# Patient Record
Sex: FEMALE | Race: WHITE | NOT HISPANIC OR LATINO | Employment: FULL TIME | URBAN - METROPOLITAN AREA
[De-identification: names, ages, dates, MRNs, and addresses within clinical notes are randomized per-mention and may not be internally consistent; named-entity substitution may affect disease eponyms.]

---

## 2020-02-13 ENCOUNTER — TELEPHONE (OUTPATIENT)
Dept: SCHEDULING | Facility: IMAGING CENTER | Age: 40
End: 2020-02-13

## 2020-02-14 ENCOUNTER — OFFICE VISIT (OUTPATIENT)
Dept: URGENT CARE | Facility: CLINIC | Age: 40
End: 2020-02-14
Payer: COMMERCIAL

## 2020-02-14 VITALS
WEIGHT: 152 LBS | RESPIRATION RATE: 14 BRPM | TEMPERATURE: 98 F | SYSTOLIC BLOOD PRESSURE: 112 MMHG | OXYGEN SATURATION: 96 % | HEIGHT: 65 IN | HEART RATE: 92 BPM | DIASTOLIC BLOOD PRESSURE: 70 MMHG | BODY MASS INDEX: 25.33 KG/M2

## 2020-02-14 DIAGNOSIS — J06.9 VIRAL URI WITH COUGH: ICD-10-CM

## 2020-02-14 DIAGNOSIS — R68.89 FLU-LIKE SYMPTOMS: ICD-10-CM

## 2020-02-14 PROCEDURE — 99203 OFFICE O/P NEW LOW 30 MIN: CPT | Performed by: PHYSICIAN ASSISTANT

## 2020-02-14 RX ORDER — GUAIFENESIN 600 MG/1
600 TABLET, EXTENDED RELEASE ORAL EVERY 12 HOURS
Qty: 28 TAB | Refills: 0 | Status: SHIPPED | OUTPATIENT
Start: 2020-02-14

## 2020-02-14 RX ORDER — FLUTICASONE PROPIONATE 50 MCG
1 SPRAY, SUSPENSION (ML) NASAL DAILY
Qty: 16 G | Refills: 0 | Status: SHIPPED | OUTPATIENT
Start: 2020-02-14

## 2020-02-14 RX ORDER — BENZONATATE 200 MG/1
200 CAPSULE ORAL 3 TIMES DAILY PRN
Qty: 60 CAP | Refills: 0 | Status: SHIPPED | OUTPATIENT
Start: 2020-02-14

## 2020-02-14 ASSESSMENT — ENCOUNTER SYMPTOMS
SHORTNESS OF BREATH: 0
WHEEZING: 0
CHILLS: 1
SORE THROAT: 1
COUGH: 1
MYALGIAS: 1

## 2020-02-14 ASSESSMENT — COPD QUESTIONNAIRES: COPD: 0

## 2020-02-14 NOTE — PROGRESS NOTES
"Subjective:   Felecia Muniz is a 40 y.o. female who presents for Cough (x1 week, congestion, ache in chest, body aches, eyes sensitive to sun ligth )        Did not receive flu vaccine.    Cough   This is a new problem. The current episode started in the past 7 days. The problem has been gradually improving (sudden onset monday/tuesday). The problem occurs constantly. The cough is productive of sputum. Associated symptoms include chills, ear congestion, ear pain, myalgias, nasal congestion, postnasal drip and a sore throat. Pertinent negatives include no chest pain, rash, shortness of breath or wheezing. Nothing aggravates the symptoms. Treatments tried: advil and theraflu. The treatment provided significant relief. There is no history of asthma, bronchitis, COPD, emphysema or pneumonia.     Review of Systems   Constitutional: Positive for chills.   HENT: Positive for ear pain, postnasal drip and sore throat.    Respiratory: Positive for cough. Negative for shortness of breath and wheezing.    Cardiovascular: Negative for chest pain.   Musculoskeletal: Positive for myalgias.   Skin: Negative for rash.       PMH:  has no past medical history of Allergy or Asthma.  MEDS:   Current Outpatient Medications:   •  guaiFENesin ER (MUCINEX) 600 MG TABLET SR 12 HR, Take 1 Tab by mouth every 12 hours., Disp: 28 Tab, Rfl: 0  •  fluticasone (FLONASE) 50 MCG/ACT nasal spray, Spray 1 Spray in nose every day., Disp: 16 g, Rfl: 0  •  benzonatate (TESSALON) 200 MG capsule, Take 1 Cap by mouth 3 times a day as needed for Cough., Disp: 60 Cap, Rfl: 0  ALLERGIES: No Known Allergies  SURGHX: History reviewed. No pertinent surgical history.  SOCHX:  reports that she has never smoked. She has never used smokeless tobacco.  FH: Family history was reviewed, no pertinent findings to report   Objective:   /70 (Patient Position: Sitting)   Pulse 92   Temp 36.7 °C (98 °F) (Temporal)   Resp 14   Ht 1.651 m (5' 5\")   Wt 68.9 kg (152 lb)  "  SpO2 96%   BMI 25.29 kg/m²   Physical Exam  Vitals signs reviewed.   Constitutional:       General: She is not in acute distress.     Appearance: Normal appearance. She is well-developed. She is not toxic-appearing.   HENT:      Head: Normocephalic and atraumatic.      Right Ear: Tympanic membrane, ear canal and external ear normal.      Left Ear: Ear canal and external ear normal. Tympanic membrane is injected.      Nose: Mucosal edema, congestion and rhinorrhea present. Rhinorrhea is clear.      Mouth/Throat:      Lips: Pink.      Mouth: Mucous membranes are moist.      Pharynx: Oropharynx is clear. Uvula midline. No posterior oropharyngeal erythema.   Eyes:      General: Lids are normal.      Conjunctiva/sclera: Conjunctivae normal.   Neck:      Musculoskeletal: Neck supple.   Cardiovascular:      Rate and Rhythm: Normal rate and regular rhythm.      Heart sounds: Normal heart sounds, S1 normal and S2 normal. No murmur. No friction rub. No gallop.    Pulmonary:      Effort: Pulmonary effort is normal. No respiratory distress.      Breath sounds: Normal breath sounds. No decreased breath sounds, wheezing, rhonchi or rales.   Musculoskeletal:      Comments: Normal range of motion. Exhibits no edema and no tenderness.    Lymphadenopathy:      Cervical: No cervical adenopathy.      Right cervical: No superficial or posterior cervical adenopathy.     Left cervical: No superficial or posterior cervical adenopathy.   Skin:     General: Skin is warm and dry.      Capillary Refill: Capillary refill takes less than 2 seconds.   Neurological:      Mental Status: She is alert and oriented to person, place, and time.      Cranial Nerves: No cranial nerve deficit.      Sensory: No sensory deficit.   Psychiatric:         Speech: Speech normal.         Behavior: Behavior normal.         Thought Content: Thought content normal.         Judgment: Judgment normal.           Assessment/Plan:   1. Viral URI with cough  -  guaiFENesin ER (MUCINEX) 600 MG TABLET SR 12 HR; Take 1 Tab by mouth every 12 hours.  Dispense: 28 Tab; Refill: 0  - fluticasone (FLONASE) 50 MCG/ACT nasal spray; Spray 1 Spray in nose every day.  Dispense: 16 g; Refill: 0  - benzonatate (TESSALON) 200 MG capsule; Take 1 Cap by mouth 3 times a day as needed for Cough.  Dispense: 60 Cap; Refill: 0    2. Flu-like symptoms    Pt presentation consistent with influenza, high prevalence in community currently, and clinical suspicion is high.    VSS, no dyspnea, no SOB, and lungs CTA on PE.  Goals of care include symptomatic control and prevention of lower respiratory spread. Signs of lower respiratory involvement discussed with pt.  Pt instructed to RTC if any of these are observed.     Drink plenty of fluids and rest.  Use nasal saline TID to promote drainage.   Salt water gurgles to soothe sore throat.  Start OTC expectorant.  APAP for fever control, and NSAIDs for throat pain/headache relief prn.    If you fail to improve in 3-5 days or symptoms worsen/new symptoms develop, RTC for reevaluation.    If symptoms worsen or new symptoms develop to include uncontrolled fevers, dyspnea, SOB, or vomiting- pt was instructed to go to the ED for evaluation.    Differential diagnosis, natural history, supportive care, and indications for immediate follow-up discussed.

## 2020-02-14 NOTE — LETTER
February 14, 2020    To Whom It May Concern:         This is confirmation that Felecia Muniz attended her scheduled appointment with Sravan Vargas P.A.-C. on 2/14/20. She may return to work on 2/17/20.         If you have any questions please do not hesitate to call me at the phone number listed below.    Sincerely,          Sravan Vargas P.A.-C.  [phone]

## 2020-05-15 ENCOUNTER — OFFICE VISIT (OUTPATIENT)
Dept: URGENT CARE | Facility: CLINIC | Age: 40
End: 2020-05-15

## 2020-05-15 VITALS
HEART RATE: 78 BPM | WEIGHT: 150 LBS | BODY MASS INDEX: 24.99 KG/M2 | RESPIRATION RATE: 12 BRPM | DIASTOLIC BLOOD PRESSURE: 70 MMHG | HEIGHT: 65 IN | SYSTOLIC BLOOD PRESSURE: 112 MMHG | TEMPERATURE: 98 F | OXYGEN SATURATION: 96 %

## 2020-05-15 DIAGNOSIS — S90.01XA CONTUSION OF RIGHT ANKLE, INITIAL ENCOUNTER: ICD-10-CM

## 2020-05-15 PROCEDURE — 99214 OFFICE O/P EST MOD 30 MIN: CPT | Performed by: PHYSICIAN ASSISTANT

## 2020-05-15 RX ORDER — NAPROXEN 500 MG/1
500 TABLET ORAL 2 TIMES DAILY WITH MEALS
Qty: 30 TAB | Refills: 0 | Status: SHIPPED | OUTPATIENT
Start: 2020-05-15

## 2020-05-15 ASSESSMENT — ENCOUNTER SYMPTOMS
FEVER: 0
CHILLS: 0
NAUSEA: 0
VOMITING: 0
MYALGIAS: 1
FALLS: 0

## 2020-05-15 NOTE — PROGRESS NOTES
Subjective:   Felecia Muniz is a 40 y.o. female who presents for Ankle Injury (x5 day, right nakle injury from charp end of crowbar, swelling, difficulty walking, )        This is a new problem.  Patient presents with concerns of right medial ankle pain and swelling x2 weeks.  Symptoms began after she was working in the garage, hammering a crowbar.  And a crowbar slipped and directly impacted soft tissue just above the ankle.  Pain is above the ankle and occasionally radiates up the inside and back of the leg when she walks.  She denies bony tenderness and loss of range of motion.  However she is concerned by persistent bruising and pain.  She wants to make sure that her veins and ligaments are okay.  Overall she states that pain, swelling, bruising have significantly improved.  She is not taking any medications for symptoms.  No other aggravating or alleviating factors.    Review of Systems   Constitutional: Negative for chills and fever.   Gastrointestinal: Negative for nausea and vomiting.   Musculoskeletal: Positive for myalgias. Negative for falls and joint pain.   Skin:        Bruising on inner ankle.       PMH:  has no past medical history of Allergy or Asthma.  MEDS:   Current Outpatient Medications:   •  naproxen (NAPROSYN) 500 MG Tab, Take 1 Tab by mouth 2 times a day, with meals., Disp: 30 Tab, Rfl: 0  •  guaiFENesin ER (MUCINEX) 600 MG TABLET SR 12 HR, Take 1 Tab by mouth every 12 hours. (Patient not taking: Reported on 5/15/2020), Disp: 28 Tab, Rfl: 0  •  fluticasone (FLONASE) 50 MCG/ACT nasal spray, Spray 1 Spray in nose every day. (Patient not taking: Reported on 5/15/2020), Disp: 16 g, Rfl: 0  •  benzonatate (TESSALON) 200 MG capsule, Take 1 Cap by mouth 3 times a day as needed for Cough. (Patient not taking: Reported on 5/15/2020), Disp: 60 Cap, Rfl: 0  ALLERGIES: No Known Allergies  SURGHX: History reviewed. No pertinent surgical history.  SOCHX:  reports that she has never smoked. She has never  "used smokeless tobacco.  FH: Family history was reviewed, no pertinent findings to report   Objective:   /70 (BP Location: Left arm, Patient Position: Sitting, BP Cuff Size: Adult)   Pulse 78   Temp 36.7 °C (98 °F) (Temporal)   Resp 12   Ht 1.651 m (5' 5\")   Wt 68 kg (150 lb)   SpO2 96%   BMI 24.96 kg/m²   Physical Exam  Vitals signs reviewed.   Constitutional:       General: She is not in acute distress.     Appearance: Normal appearance. She is well-developed. She is not toxic-appearing.   HENT:      Head: Normocephalic and atraumatic.      Right Ear: External ear normal.      Left Ear: External ear normal.      Nose: Nose normal.   Eyes:      General: Lids are normal.      Conjunctiva/sclera: Conjunctivae normal.   Neck:      Musculoskeletal: Neck supple.   Cardiovascular:      Rate and Rhythm: Normal rate and regular rhythm.   Pulmonary:      Effort: Pulmonary effort is normal. No respiratory distress.      Breath sounds: Normal breath sounds.   Musculoskeletal:      Comments: Right ankle/foot:  Appearance -  4x3 cm healing bruise. Trace localized swelling.   Palpation - No TTP along medial malleolus or deltoid ligament.  No TTP of lateral malleolus, ATFL, CFL, or PTFL.  No TTP along midfoot, base of the 5th metatarsal, MTP joints, or toes.   ROM - FROM throughout  Strength - 5/5 throughout  Neurovascular - 2+ dorsalis pedis and posterior tibial.  Sensation intact and equal bilaterally        Skin:     General: Skin is warm and dry.      Capillary Refill: Capillary refill takes less than 2 seconds.   Neurological:      Mental Status: She is alert and oriented to person, place, and time.      Cranial Nerves: No cranial nerve deficit.      Sensory: No sensory deficit.   Psychiatric:         Speech: Speech normal.         Behavior: Behavior normal.         Thought Content: Thought content normal.         Judgment: Judgment normal.           Assessment/Plan:   1. Contusion of right ankle, initial " encounter  - naproxen (NAPROSYN) 500 MG Tab; Take 1 Tab by mouth 2 times a day, with meals.  Dispense: 30 Tab; Refill: 0    Physical exam consistent with right ankle contusion that is resolving.  Overall patient symptoms are improving.  Mobility is within normal limits and strength is quite good.  She is neurovascularly intact distally.  Patient advised that she should expect to see complete resolution of symptoms in the next 2 weeks.  If she develops any new or worsening symptoms I would like her to see PCP or return to urgent care for reevaluation.  Compression wrap as needed for pain and swelling naproxen as needed for pain and swelling.    Differential diagnosis, natural history, supportive care, and indications for immediate follow-up discussed.

## 2020-06-11 ENCOUNTER — OFFICE VISIT (OUTPATIENT)
Dept: URGENT CARE | Facility: CLINIC | Age: 40
End: 2020-06-11
Payer: OTHER MISCELLANEOUS

## 2020-06-11 VITALS
HEIGHT: 65 IN | RESPIRATION RATE: 16 BRPM | WEIGHT: 145 LBS | HEART RATE: 84 BPM | BODY MASS INDEX: 24.16 KG/M2 | OXYGEN SATURATION: 97 % | DIASTOLIC BLOOD PRESSURE: 78 MMHG | SYSTOLIC BLOOD PRESSURE: 122 MMHG | TEMPERATURE: 98.8 F

## 2020-06-11 DIAGNOSIS — R22.31 MASS OF RIGHT AXILLA: ICD-10-CM

## 2020-06-11 PROCEDURE — 99203 OFFICE O/P NEW LOW 30 MIN: CPT | Performed by: NURSE PRACTITIONER

## 2020-06-11 ASSESSMENT — PAIN SCALES - GENERAL: PAINLEVEL: 3=SLIGHT PAIN

## 2020-06-11 NOTE — LETTER
June 11, 2020       Patient: Felecia Muniz   YOB: 1980   Date of Visit: 6/11/2020         To Whom It May Concern:    Felecia Muniz should be excused on 6/12/20 from work for a diagnostic test ordered.              Sincerely,          Cathey J Hamman, A.P.N.  Electronically Signed

## 2020-06-12 NOTE — PROGRESS NOTES
Subjective:      Felecia Muniz is a 40 y.o. female who presents with Bump ( found lump on right side of arm pit- small in size- painful )    No past medical history on file.  Social History     Socioeconomic History   • Marital status: Single     Spouse name: Not on file   • Number of children: Not on file   • Years of education: Not on file   • Highest education level: Not on file   Occupational History   • Not on file   Social Needs   • Financial resource strain: Not on file   • Food insecurity     Worry: Not on file     Inability: Not on file   • Transportation needs     Medical: Not on file     Non-medical: Not on file   Tobacco Use   • Smoking status: Never Smoker   • Smokeless tobacco: Never Used   Substance and Sexual Activity   • Alcohol use: Not on file   • Drug use: Not on file   • Sexual activity: Not on file   Lifestyle   • Physical activity     Days per week: Not on file     Minutes per session: Not on file   • Stress: Not on file   Relationships   • Social connections     Talks on phone: Not on file     Gets together: Not on file     Attends Latter day service: Not on file     Active member of club or organization: Not on file     Attends meetings of clubs or organizations: Not on file     Relationship status: Not on file   • Intimate partner violence     Fear of current or ex partner: Not on file     Emotionally abused: Not on file     Physically abused: Not on file     Forced sexual activity: Not on file   Other Topics Concern   • Not on file   Social History Narrative   • Not on file     No family history on file.    Allergies: Patient has no known allergies.    Patient is a 40-year-old female who presents today with complaint of nonpainful small mass to the right axilla.  Patient is concerned as there has been a family history of cancer.  No other symptoms.  States she noted this earlier today in the shower.          Other   This is a new problem. The problem occurs constantly. The problem has been  "unchanged. Nothing aggravates the symptoms. She has tried nothing for the symptoms. The treatment provided no relief.       Review of Systems   Musculoskeletal:        Right axillary mass   All other systems reviewed and are negative.         Objective:     /78   Pulse 84   Temp 37.1 °C (98.8 °F)   Resp 16   Ht 1.651 m (5' 5\")   Wt 65.8 kg (145 lb)   SpO2 97%   BMI 24.13 kg/m²      Physical Exam  Vitals signs reviewed.   Constitutional:       Appearance: Normal appearance.   HENT:      Head: Normocephalic and atraumatic.   Musculoskeletal:      Comments: Small palpable 1 cm area of thickening in the right axilla.  No fluctuance.  No erythema or redness.  No drainage.  Area is nonpainful.  No other masses palpable.  No masses palpable in the left axilla.   Neurological:      Mental Status: She is alert.                 Assessment/Plan:   Right axillary mass    Ultrasound ordered; will call results  Consider referral as appropriate dependent on the outcome of ultrasound    There are no diagnoses linked to this encounter.    "

## 2020-06-24 ENCOUNTER — TELEPHONE (OUTPATIENT)
Dept: URGENT CARE | Facility: CLINIC | Age: 40
End: 2020-06-24

## 2020-06-24 NOTE — TELEPHONE ENCOUNTER
Hi, you seen patient at [facility] on 6/11/2020 for a mass of right axilla and ordered an Ultrasound.. her insurance is out of network with [facility] and will need a Script written with that ordering info on it in order for her to get this done at [place].      Also, I received a message from the radiologist stating that patient also has breast pain, so to please add a Diagnostic Mammogram to that script.        Please advise.    Felecia Muniz  [phone] ([place])    or   Marcia at [facility] #[phone]

## 2020-06-24 NOTE — TELEPHONE ENCOUNTER
Pt called today and said that she can no get the ultra sound scheduled because she needs an order for a bilateral gary as well. Her insurance is contracted with [facility], they're requiring the order for the gary because she's having pain.

## 2020-06-26 ENCOUNTER — TELEPHONE (OUTPATIENT)
Dept: URGENT CARE | Facility: CLINIC | Age: 40
End: 2020-06-26

## 2020-06-26 DIAGNOSIS — R22.30 AXILLARY MASS, UNSPECIFIED LATERALITY: ICD-10-CM

## 2020-06-26 NOTE — TELEPHONE ENCOUNTER
"Bela with [facility] called stating that the imaging order for this pt needs to be revised:     \"Per our radiology protocol, please indicate bilateral Diagnostic mammogram with R breast ultrasound and fax new order to [phone]\"    Let me know if I can do anything to help.   "

## 2020-06-27 DIAGNOSIS — R22.31 AXILLARY MASS, RIGHT: ICD-10-CM

## 2020-07-02 ENCOUNTER — TELEPHONE (OUTPATIENT)
Dept: URGENT CARE | Facility: CLINIC | Age: 40
End: 2020-07-02

## 2020-07-02 NOTE — TELEPHONE ENCOUNTER
Healthcare worker called stating if we can fax over orders for ultrasound and Mammo. to [phone]  And [phone]  See scanned Paperwork

## 2020-07-14 ENCOUNTER — HOSPITAL ENCOUNTER (OUTPATIENT)
Dept: HOSPITAL 8 - CFH | Age: 40
Discharge: HOME | End: 2020-07-14
Attending: NURSE PRACTITIONER
Payer: COMMERCIAL

## 2020-07-14 DIAGNOSIS — N63.15: Primary | ICD-10-CM

## 2020-07-14 PROCEDURE — G0279 TOMOSYNTHESIS, MAMMO: HCPCS

## 2020-07-14 PROCEDURE — 76642 ULTRASOUND BREAST LIMITED: CPT

## 2020-07-14 PROCEDURE — 77066 DX MAMMO INCL CAD BI: CPT

## 2020-08-06 DIAGNOSIS — R22.31 AXILLARY MASS, RIGHT: ICD-10-CM

## 2020-08-06 DIAGNOSIS — R22.30 AXILLARY MASS, UNSPECIFIED LATERALITY: ICD-10-CM

## 2020-08-07 ENCOUNTER — TELEPHONE (OUTPATIENT)
Dept: URGENT CARE | Facility: PHYSICIAN GROUP | Age: 40
End: 2020-08-07

## 2020-08-07 NOTE — TELEPHONE ENCOUNTER
Attempted to follow-up with patient by phone regarding results of mammogram and ultrasound.  She had these studies done that were ordered by me at [place] on July 17.  I received scanned documents in the patient's chart today with the results.  Biopsy was recommended.  According to the notes I reviewed, patient was in process of being scheduled for this.  However, I am attempting to follow-up with patient in order to determine that she does in fact have biopsy scheduled as advised.  There was no answer when I attempted to call the patient.  Left detailed voice message and requested callback for follow-up.  Patient is not registered for a [employer] account at the time of attempted contact.

## 2021-05-28 ENCOUNTER — APPOINTMENT (OUTPATIENT)
Dept: RADIOLOGY | Facility: MEDICAL CENTER | Age: 41
End: 2021-05-28
Attending: EMERGENCY MEDICINE
Payer: COMMERCIAL

## 2021-05-28 ENCOUNTER — HOSPITAL ENCOUNTER (EMERGENCY)
Facility: MEDICAL CENTER | Age: 41
End: 2021-05-28
Attending: EMERGENCY MEDICINE
Payer: COMMERCIAL

## 2021-05-28 VITALS
RESPIRATION RATE: 18 BRPM | WEIGHT: 147.71 LBS | HEART RATE: 61 BPM | TEMPERATURE: 98.7 F | HEIGHT: 65 IN | DIASTOLIC BLOOD PRESSURE: 61 MMHG | SYSTOLIC BLOOD PRESSURE: 95 MMHG | OXYGEN SATURATION: 97 % | BODY MASS INDEX: 24.61 KG/M2

## 2021-05-28 DIAGNOSIS — M79.645 PAIN OF FINGER OF LEFT HAND: ICD-10-CM

## 2021-05-28 PROCEDURE — 73140 X-RAY EXAM OF FINGER(S): CPT | Mod: LT

## 2021-05-28 PROCEDURE — 99283 EMERGENCY DEPT VISIT LOW MDM: CPT

## 2021-05-28 NOTE — ED TRIAGE NOTES
"Approximately 3 weeks ago a piece of glass from her phone's broken screen caused \"a small cut\" to her left index.  She presents today complaining of increasing pain since yesterday.   Chief Complaint   Patient presents with   • Digit Pain   • Arm Pain     /65   Pulse 77   Temp 36.5 °C (97.7 °F) (Temporal)   Resp 18   Ht 1.651 m (5' 5\")   Wt 67 kg (147 lb 11.3 oz)   LMP 05/24/2021 (Exact Date)   SpO2 95%   BMI 24.58 kg/m²      "

## 2021-05-28 NOTE — ED NOTES
"To RTA 16  L index finger tender  Slightly tender to finger  Possible FB \"glass sliver\" in finger   "

## 2021-05-29 NOTE — DISCHARGE INSTRUCTIONS
If the area becomes red or swollen or has pus or discharge return for recheck.  Use Tylenol and ibuprofen as needed for discomfort.  Cover the area that is painful with a Band-Aid when it is sensitive.  You may call the orthopedic office for second opinion with the orthopedist.

## 2021-05-29 NOTE — ED PROVIDER NOTES
[facility] Provider Note    CHIEF COMPLAINT  Chief Complaint   Patient presents with   • Digit Pain   • Arm Pain       HPI  Felecia Muniz is a 41 y.o. female who presents to the emergency department complaining of left index finger pain radiating up the left arm.  The patient says 3 weeks ago she dropped her phone and the glass screen broke and she got some glass slivers in her finger.  The patient thought that she had removed all the glass from the skin but today she rubs her finger up against something and felt a lot of pain over the lateral aspect of the index finger where the splinters had been and she also notes that this morning the finger was swollen but that has improved.  She feels like there is a splinter under the skin.    REVIEW OF SYSTEMS no other injury or mechanism and she otherwise has not been ill    PAST MEDICAL HISTORY  History reviewed. No pertinent past medical history.    FAMILY HISTORY  History reviewed. No pertinent family history.    SOCIAL HISTORY  Social History     Socioeconomic History   • Marital status: Single     Spouse name: Not on file   • Number of children: Not on file   • Years of education: Not on file   • Highest education level: Not on file   Occupational History   • Not on file   Tobacco Use   • Smoking status: Never Smoker   • Smokeless tobacco: Never Used   Substance and Sexual Activity   • Alcohol use: Never   • Drug use: Yes     Comment: Marijuana   • Sexual activity: Not on file   Other Topics Concern   • Not on file   Social History Narrative   • Not on file     Social Determinants of Health     Financial Resource Strain:    • Difficulty of Paying Living Expenses:    Food Insecurity:    • Worried About Running Out of Food in the Last Year:    • Ran Out of Food in the Last Year:    Transportation Needs:    • Lack of Transportation (Medical):    • Lack of Transportation (Non-Medical):    Physical Activity:    • Days of Exercise per Week:    • Minutes of Exercise per Session:   "  Stress:    • Feeling of Stress :    Social Connections:    • Frequency of Communication with Friends and Family:    • Frequency of Social Gatherings with Friends and Family:    • Attends Confucianism Services:    • Active Member of Clubs or Organizations:    • Attends Club or Organization Meetings:    • Marital Status:    Intimate Partner Violence:    • Fear of Current or Ex-Partner:    • Emotionally Abused:    • Physically Abused:    • Sexually Abused:        SURGICAL HISTORY  History reviewed. No pertinent surgical history.    CURRENT MEDICATIONS  Home Medications    **Home medications have not yet been reviewed for this encounter**         ALLERGIES  No Known Allergies    PHYSICAL EXAM  VITAL SIGNS: BP (!) 95/61   Pulse 61   Temp 37.1 °C (98.7 °F) (Temporal)   Resp 18   Ht 1.651 m (5' 5\")   Wt 67 kg (147 lb 11.3 oz)   LMP 05/24/2021 (Exact Date)   SpO2 97%   BMI 24.58 kg/m²    Oxygen saturation is interpreted as adequate  Constitutional: Awake well-appearing pleasant individual  Musculoskeletal: Examination of the left index finger reveals there is no swelling or erythema at this time although the patient says that this morning it was swollen around the PIP joint.  There is a tender area just over the lateral part of the PIP joint.  There is no pus or discharge or signs of infection.  I examined the area under Woods lamp magnification and I could not find any visible foreign bodies.  The patient can flex and extend all the joints of the finger    Radiology  DX-FINGER(S) 2+ LEFT   Final Result      1.  No evidence of fracture or dislocation.      2.  Tiny linear faint radiopacity within the soft tissues ulnar to the distal aspect of the second proximal phalanx. This is seen on one view only and may represent artifact versus a tiny linear radiopaque foreign body.          MEDICAL DECISION MAKING and DISPOSITION  I have reviewed the x-ray findings with the patient and I do think there is probably an extremely " tiny splinter in the skin.  At this point I do not think that we should try and did get out I think that would do a lot more damage than leaving it in place and I think that over time the splinter will work its way out.  I do not think the patient faces any systemic danger other than possible infection and I have reviewed signs of infection with her.  I have recommended that if the area is sensitive she covered with a Band-Aid and if it becomes red or swollen or has pus or discharge she is to return here at once for recheck.  If the patient would like to speak with the hand surgeon about this I have given her Dr. Cardona's office number and she is to call Monday and arrange recheck in the office    IMPRESSION  1.  Very tiny glass splinter embedded in the skin of the left index finger         Electronically signed by: Migel Hendricks M.D., 5/28/2021 5:59 PM